# Patient Record
Sex: FEMALE | Race: BLACK OR AFRICAN AMERICAN | NOT HISPANIC OR LATINO | URBAN - METROPOLITAN AREA
[De-identification: names, ages, dates, MRNs, and addresses within clinical notes are randomized per-mention and may not be internally consistent; named-entity substitution may affect disease eponyms.]

---

## 2019-03-06 DIAGNOSIS — F41.9 ANXIETY: Primary | ICD-10-CM

## 2019-03-06 RX ORDER — CLORAZEPATE DIPOTASSIUM 7.5 MG/1
7.5 TABLET ORAL EVERY 12 HOURS
Qty: 60 TABLET | Refills: 2 | Status: SHIPPED | OUTPATIENT
Start: 2019-03-06 | End: 2019-08-02 | Stop reason: SDUPTHER

## 2019-03-06 RX ORDER — CLORAZEPATE DIPOTASSIUM 7.5 MG/1
7.5 TABLET ORAL EVERY 12 HOURS
Refills: 2 | COMMUNITY
Start: 2019-01-22 | End: 2019-03-06 | Stop reason: SDUPTHER

## 2019-03-07 DIAGNOSIS — E87.6 HYPOKALEMIA: Primary | ICD-10-CM

## 2019-03-07 RX ORDER — POTASSIUM CHLORIDE 20 MEQ/1
20 TABLET, EXTENDED RELEASE ORAL 2 TIMES DAILY
Qty: 60 TABLET | Refills: 5 | Status: SHIPPED | OUTPATIENT
Start: 2019-03-07 | End: 2019-11-23 | Stop reason: SDUPTHER

## 2019-05-28 DIAGNOSIS — I10 ESSENTIAL HYPERTENSION: Primary | ICD-10-CM

## 2019-05-28 RX ORDER — ENALAPRIL MALEATE 20 MG/1
20 TABLET ORAL 2 TIMES DAILY
Qty: 180 TABLET | Refills: 3 | Status: SHIPPED | OUTPATIENT
Start: 2019-05-28 | End: 2019-12-29

## 2019-05-28 RX ORDER — ENALAPRIL MALEATE 20 MG/1
1 TABLET ORAL 2 TIMES DAILY
COMMUNITY
End: 2019-05-28 | Stop reason: SDUPTHER

## 2019-08-02 DIAGNOSIS — F41.9 ANXIETY: ICD-10-CM

## 2019-08-02 RX ORDER — CLORAZEPATE DIPOTASSIUM 7.5 MG/1
7.5 TABLET ORAL EVERY 12 HOURS
Qty: 60 TABLET | Refills: 2 | Status: SHIPPED | OUTPATIENT
Start: 2019-08-02

## 2019-11-23 DIAGNOSIS — E87.6 HYPOKALEMIA: ICD-10-CM

## 2019-11-24 RX ORDER — POTASSIUM CHLORIDE 20 MEQ/1
TABLET, EXTENDED RELEASE ORAL
Qty: 60 TABLET | Refills: 5 | Status: SHIPPED | OUTPATIENT
Start: 2019-11-24

## 2019-12-29 DIAGNOSIS — I10 ESSENTIAL HYPERTENSION: ICD-10-CM

## 2019-12-29 RX ORDER — ENALAPRIL MALEATE 20 MG/1
TABLET ORAL
Qty: 60 TABLET | Refills: 5 | Status: SHIPPED | OUTPATIENT
Start: 2019-12-29

## 2024-04-09 ENCOUNTER — TELEPHONE (OUTPATIENT)
Dept: HEMATOLOGY/ONCOLOGY | Facility: CLINIC | Age: 49
End: 2024-04-09
Payer: COMMERCIAL

## 2024-04-09 PROBLEM — I63.9 CRYPTOGENIC STROKE: Status: ACTIVE | Noted: 2024-04-09

## 2024-04-10 ENCOUNTER — OFFICE VISIT (OUTPATIENT)
Dept: HEMATOLOGY/ONCOLOGY | Facility: CLINIC | Age: 49
End: 2024-04-10
Attending: INTERNAL MEDICINE
Payer: COMMERCIAL

## 2024-04-10 VITALS
DIASTOLIC BLOOD PRESSURE: 82 MMHG | RESPIRATION RATE: 18 BRPM | OXYGEN SATURATION: 100 % | BODY MASS INDEX: 37.59 KG/M2 | HEIGHT: 64 IN | SYSTOLIC BLOOD PRESSURE: 145 MMHG | HEART RATE: 65 BPM | TEMPERATURE: 98 F | WEIGHT: 220.19 LBS

## 2024-04-10 DIAGNOSIS — E78.5 DYSLIPIDEMIA: ICD-10-CM

## 2024-04-10 DIAGNOSIS — I10 HYPERTENSION, UNSPECIFIED TYPE: ICD-10-CM

## 2024-04-10 DIAGNOSIS — E11.59 TYPE 2 DIABETES MELLITUS WITH OTHER CIRCULATORY COMPLICATION, WITHOUT LONG-TERM CURRENT USE OF INSULIN: ICD-10-CM

## 2024-04-10 DIAGNOSIS — I63.9 CRYPTOGENIC STROKE: Primary | ICD-10-CM

## 2024-04-10 DIAGNOSIS — I63.9 CRYPTOGENIC STROKE: ICD-10-CM

## 2024-04-10 LAB
EST. AVERAGE GLUCOSE BLD GHB EST-MCNC: 131.2 MG/DL
HBA1C MFR BLD: 6.2 %

## 2024-04-10 PROCEDURE — 85613 RUSSELL VIPER VENOM DILUTED: CPT | Performed by: INTERNAL MEDICINE

## 2024-04-10 PROCEDURE — 86140 C-REACTIVE PROTEIN: CPT | Performed by: INTERNAL MEDICINE

## 2024-04-10 PROCEDURE — 36415 COLL VENOUS BLD VENIPUNCTURE: CPT | Performed by: INTERNAL MEDICINE

## 2024-04-10 PROCEDURE — 83036 HEMOGLOBIN GLYCOSYLATED A1C: CPT | Performed by: INTERNAL MEDICINE

## 2024-04-10 PROCEDURE — 85303 CLOT INHIBIT PROT C ACTIVITY: CPT | Performed by: INTERNAL MEDICINE

## 2024-04-10 PROCEDURE — 85670 THROMBIN TIME PLASMA: CPT | Performed by: INTERNAL MEDICINE

## 2024-04-10 PROCEDURE — 99205 OFFICE O/P NEW HI 60 MIN: CPT | Mod: S$PBB,,, | Performed by: INTERNAL MEDICINE

## 2024-04-10 PROCEDURE — 86038 ANTINUCLEAR ANTIBODIES: CPT | Performed by: INTERNAL MEDICINE

## 2024-04-10 PROCEDURE — 86039 ANTINUCLEAR ANTIBODIES (ANA): CPT | Performed by: INTERNAL MEDICINE

## 2024-04-10 PROCEDURE — 85306 CLOT INHIBIT PROT S FREE: CPT | Performed by: INTERNAL MEDICINE

## 2024-04-10 PROCEDURE — 86036 ANCA SCREEN EACH ANTIBODY: CPT | Performed by: INTERNAL MEDICINE

## 2024-04-10 PROCEDURE — 85652 RBC SED RATE AUTOMATED: CPT | Performed by: INTERNAL MEDICINE

## 2024-04-10 PROCEDURE — 86147 CARDIOLIPIN ANTIBODY EA IG: CPT | Performed by: INTERNAL MEDICINE

## 2024-04-10 PROCEDURE — 99214 OFFICE O/P EST MOD 30 MIN: CPT | Mod: PBBFAC | Performed by: INTERNAL MEDICINE

## 2024-04-10 RX ORDER — NIFEDIPINE 60 MG/1
60 TABLET, EXTENDED RELEASE ORAL EVERY MORNING
COMMUNITY

## 2024-04-10 RX ORDER — LISINOPRIL 20 MG/1
20 TABLET ORAL
COMMUNITY
Start: 2023-10-27

## 2024-04-10 RX ORDER — MAGNESIUM GLUCONATE 27.5 (500)
500 TABLET ORAL
COMMUNITY

## 2024-04-10 RX ORDER — DULAGLUTIDE 0.75 MG/.5ML
0.75 INJECTION, SOLUTION SUBCUTANEOUS WEEKLY
COMMUNITY

## 2024-04-10 RX ORDER — GLIPIZIDE 5 MG/1
5 TABLET, FILM COATED, EXTENDED RELEASE ORAL EVERY MORNING
COMMUNITY
Start: 2023-10-27

## 2024-04-10 RX ORDER — NAPROXEN SODIUM 220 MG/1
1 TABLET, FILM COATED ORAL EVERY MORNING
COMMUNITY
Start: 2023-10-27 | End: 2024-10-26

## 2024-04-10 NOTE — PROGRESS NOTES
History:  Past Medical History:   Diagnosis Date    Diabetes mellitus     High cholesterol     Hypertension     Stroke        Past Surgical History:   Procedure Laterality Date     SECTION      x3      Past medical history:  Hypertension; dyslipidemia; NIDDM; obesity, morbid; anxiety; CVA due to stenosis of small artery with right-sided deficits    Social history:  .  Lives in [place].  Has 5 children.  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  Used to drink 4-5 alcoholic drinks every week; drank for 3-4 years; quit after stroke which she experienced in 2023.  Nonsmoker.  No illicit drugs.      Family history:  Mother experienced a CVA at age 72 (she was history of hypertension, diabetes mellitus, probably dyslipidemia, and smoking).  Father  from MI at age 40.      Health maintenance:  As of 04/10/2024, she has never had screening mammogram done and she is scared of getting a mammogram done.  At my advice, she has agreed to discuss this with her PCP further.    Menstrual history:  Says that she used to have heavy menstrual cycles before experiencing ischemic stroke in 2023; subsequently, she is trying to manage this condition herself by dietary modifications.  As of 04/10/2024, she has not consulted with gyn as yet.  Says that after stroke, cycles have eased up.  Social History     Socioeconomic History    Marital status:    Tobacco Use    Smoking status: Never    Smokeless tobacco: Never   Substance and Sexual Activity    Alcohol use: Not Currently    Drug use: Never      Family History   Problem Relation Age of Onset    Diabetes Mother     Hypertension Mother     Hypertension Father         Reason for Follow-up:  Cryptogenic CVA    History of Present Illness:   Cerebrovascular Accident      Oncologic/Hematologic History:  Oncology History    No history exists.   48-year-old lady, referred by Keyshawn Moore MD, [facility], for evaluation of  CVA.    She experienced ischemic stroke due to stenosis of small artery.  Has been referred by Neurology for hypercoagulable workup given young age for CVA.    Clinical events:  -presentation:  10/24/2023 (hospitalization 10/24/2023-10/26/2023:  [facility]):  Right-sided facial droop, slurring of speech, right arm and right leg weakness and difficulty walking since 10/20/2023; did not seek medical attention at the time of onset of symptoms; MRI brain revealed acute left pontine infarct; etiology of the stroke was considered to be small-vessel disease in the setting of comorbidities; her symptoms were not localizing to the territory of the left ICA; brainstem infarct in the territory of basilar pontine perforators; she was not a candidate for IV thrombolytics due to be onset of symptoms > 4.5 hours and was not a candidate for mechanical thrombectomy to the absence of large vessel occlusion; she was started on dual antiplatelet therapy X 21 days followed by monotherapy along with high-intensity statin; during hospitalization, hemoglobin A1c came back at 7.7 and lipid panel showed  mg/dL  -10/24/2023:  Chest x-ray one view:  No evidence of acute cardiac or pulmonary disease  -10/24/2023:  CT head without contrast:  No acute intracranial abnormality  -10/24/2023:  CTA neck with IV contrast:  No hemodynamically significant stenosis, aneurysm, or acute vascular injury within the neck  -10/24/2023: CTA head: Segmental moderate to severe narrowing of the left anterior supraclinoid ICA; no occlusion or aneurysm  -10/25/2023:  MRI brain without contrast (comparison:  CT brain 10/24/2023):  Acute ischemia in left jean paul  -10/25/2023: TTE:  Negative bubble study; normal LVEF, 60-65%; grade 1 (mild) LV diastolic dysfunction  -01/10/2024:  [facility] vascular neurology clinic note:  Some weakness right arm along with stiffness and pain; significant recovery in the right leg and almost  being back to baseline; able to return to work; has not been using her right arm and hand as much as compared to the left side; compliant with aspirin 81 mg daily and Lipitor 80 mg daily      Labs reviewed:  -10/24/2023: Hemoglobin A1c 7.7  -10/24/2023:  PT/INR 12.2/1.0, normal; PTT 27 seconds, normal  -10/24/2023:  CMP: Glucose 211 mg/dL  -10/25/2023:  Cholesterol 272; HDL 50; ; non-HDL cholesterol 222; triglycerides 119 (normal)  -10/25/2023:  Urine drug skin: Negative  -10/26/2023:  BMP: Potassium 3.1, low; glucose 104; calcium 8.6  -10/26/2023:  CBC:  Hemoglobin 11.2; MCV 83; platelets 387 K    04/10/2024:  Pleasant lady who presents for initial hematology consultation.  In no acute discomfort.  Says that post CVA weakness on right side of body has improved considerably.  Strength has completely recovered in right lower extremity.  Some weakness persists in right upper extremity.   strength with right hand has not fully recovered.  No numbness.  No facial numbness or residual speech impairment.  No headaches, vision impairment, or seizure-like activity.  She fully return to her job as LPN at nursing home, 12/21/2023.  Currently, on baby aspirin daily.  No GI side effects.  No bleeding or bruising with aspirin.  Denies history of blood clots in any form.  No history of lupus or vasculitis.  Denies weakness, fatigue, malaise, recurrent fevers, drenching night sweats, anorexia, unintentional weight loss, or lymph node normal.  No chest pain, cough, dyspnea, abdominal pain, nausea, vomiting, etc..  ECOG 0.       Interval History:  [No matching plan found]   [No matching plan found]       Medications:  Current Outpatient Medications on File Prior to Visit   Medication Sig Dispense Refill    aspirin 81 MG Chew Take 1 tablet by mouth every morning.      glipiZIDE 5 MG TR24 Take 5 mg by mouth every morning.      lisinopriL (PRINIVIL,ZESTRIL) 20 MG tablet Take 20 mg by mouth.      magnesium gluconate 27.5 mg  magne- sium (500 mg) Tab Take 500 mg by mouth.      NIFEdipine (PROCARDIA-XL) 60 MG (OSM) 24 hr tablet Take 60 mg by mouth every morning.      TRULICITY 0.75 mg/0.5 mL pen injector Inject 0.75 mg into the skin once a week.      clorazepate (TRANXENE) 7.5 MG Tab Take 1 tablet (7.5 mg total) by mouth every 12 (twelve) hours. (Patient not taking: Reported on 4/10/2024) 60 tablet 2    enalapril (VASOTEC) 20 MG tablet TAKE 1 TABLET BY MOUTH TWICE A DAY (Patient not taking: Reported on 4/10/2024) 60 tablet 5    potassium chloride SA (K-DUR,KLOR-CON) 20 MEQ tablet TAKE 1 TABLET BY MOUTH TWICE A DAY (Patient not taking: Reported on 4/10/2024) 60 tablet 5     No current facility-administered medications on file prior to visit.       Review of Systems:   All systems reviewed and found to be negative except for the symptoms detailed above    Physical Examination:   VITAL SIGNS:   Vitals:    04/10/24 1338   BP: (!) 145/82   Pulse: 65   Resp: 18   Temp: 98.2 °F (36.8 °C)     GENERAL:  In no apparent distress.    HEAD:  No signs of head trauma.  EYES:  Pupils are equal.  Extraocular motions intact.    EARS:  Hearing grossly intact.  MOUTH:  Oropharynx is normal.   NECK:  No adenopathy, no JVD.     CHEST:  Chest with clear breath sounds bilaterally.  No wheezes, rales, rhonchi.    CARDIAC:  Regular rate and rhythm.  S1 and S2, without murmurs, gallops, rubs.  VASCULAR:  No Edema.  Peripheral pulses normal and equal in all extremities.  ABDOMEN:  Soft, without detectable tenderness.  No sign of distention.  No   rebound or guarding, and no masses palpated.   Bowel Sounds normal.  MUSCULOSKELETAL:  Good range of motion of all major joints. Extremities without clubbing, cyanosis or edema.    NEUROLOGIC EXAM:  Alert and oriented x 3.  No focal sensory or strength deficits.   Speech normal.  Follows commands.  PSYCHIATRIC:  Mood normal.    No results found for this or any previous visit.  No results found for this or any previous  visit.    Assessment:  Problem List Items Addressed This Visit          Neuro    Cryptogenic stroke - Primary     Other Visit Diagnoses       Hypertension, unspecified type        Type 2 diabetes mellitus with other circulatory complication, without long-term current use of insulin        Relevant Medications    glipiZIDE 5 MG TR24    TRULICITY 0.75 mg/0.5 mL pen injector    Dyslipidemia              Ischemic CVA, presumably, cryptogenic/small-vessel disease:  -presentation:  10/24/2023 (hospitalization 10/24/2023-10/26/2023:  [facility]):  Right-sided facial droop, slurring of speech, right arm and right leg weakness and difficulty walking since 10/20/2023; did not seek medical attention at the time of onset of symptoms; MRI brain revealed acute left pontine infarct; etiology of the stroke was considered to be small-vessel disease in the setting of comorbidities; her symptoms were not localizing to the territory of the left ICA; brainstem infarct in the territory of basilar pontine perforators; she was not a candidate for IV thrombolytics due to be onset of symptoms > 4.5 hours and was not a candidate for mechanical thrombectomy to the absence of large vessel occlusion; she was started on dual antiplatelet therapy X 21 days followed by monotherapy along with high-intensity statin; during hospitalization, hemoglobin A1c came back at 7.7 and lipid panel showed  mg/dL  -10/24/2023:  Chest x-ray one view:  No evidence of acute cardiac or pulmonary disease  -10/24/2023:  CT head without contrast:  No acute intracranial abnormality  -10/24/2023:  CTA neck with IV contrast:  No hemodynamically significant stenosis, aneurysm, or acute vascular injury within the neck  -10/24/2023: CTA head: Segmental moderate to severe narrowing of the left anterior supraclinoid ICA; no occlusion or aneurysm  -10/25/2023:  MRI brain without contrast (comparison:  CT brain 10/24/2023):  Acute ischemia in left  jean paul  -10/25/2023: TTE:  Negative bubble study; normal LVEF, 60-65%; grade 1 (mild) LV diastolic dysfunction  -01/10/2024:  [facility] vascular neurology clinic note:  Some weakness right arm along with stiffness and pain; significant recovery in the right leg and almost being back to baseline; able to return to work; has not been using her right arm and hand as much as compared to the left side; compliant with aspirin 81 mg daily and Lipitor 80 mg daily  To summarize:  -history of hypertension, NIDDM, dyslipidemia; uncontrolled; noncompliant with medications  -experienced acute ischemic CVA left jean paul with right-sided facial droop, slurring of speech, right arm and right leg weakness and difficulty walking since 10/20/2023 before presenting to the hospital 10/24/2023  -the stroke was considered to be small-vessel disease in the setting of comorbidities including hypertension, NIDDM, dyslipidemia  -she was found to have severe narrowing of the left anterior supraclinoid ICA but symptoms were not localizing to the territory of the left ICA  -per neurology, brainstem infarct in the territory of the basilar pontine perforators  -she was not a candidate for IV thrombolytics due to the onset of symptoms > 4.5 hours  -she was not a candidate for mechanical thrombectomy due to the absence of large vessel occlusion  -she was treated with dual antiplatelet therapy X 21 days, followed by monotherapy  -CTA neck 10/24/2023:  No hemodynamically significant stenosis   -CTA head 10/24/2023:  Severe narrowing of the left anterior supraclinoid ICA; no occlusion or aneurysm  -MRI brain without contrast 10/25/2023:  Acute ischemia left jean paul  -TTE 10/25/2023:  Negative bubble study; normal LVEF  -she was noncompliant with medications for NIDDM, hypertension, and dyslipidemia         Plan:   Check lupus anticoagulant, anticardiolipin antibody, beta 2 glycoprotein 1 antibody  Check GONZÁLEZ, double-stranded DNA antibody, ESR, CRP,  serum cryoglobulins, Anca, complement levels  Factor 5 Leiden mutation, prothrombin gene mutation, free protein S antigen level, protein C activity, antithrombin 3 level  Follow-up in 3 weeks with labs  ------------------------------------      Patient experienced cryptogenic stroke (no definite cardioembolism, large artery atherosclerosis, or evidence of atrial fibrillation on 12 lead EKG; not clear whether the patient underwent prolonged cardiac monitoring or not)    -it is unclear whether she underwent prolonged cardiac monitoring to rule out the possibility of atrial fibrillation; if not, then, she needs to follow-up with cardiology  -meticulous management of hypertension, NIDDM, and dyslipidemia per primary care provider  -continue antiplatelet therapy; follow-up with neurology    For evaluation of cryptogenic stroke, we will order the following:   Testing for the antiphospholipid syndrome: Antiphospholipid antibody; anticardiolipin antibody; beta 2 glycoprotein 1 antibody  Check GONZÁLEZ, double-stranded DNA antibody, ESR, CRP, serum cryoglobulins, Anca, complement levels  Additional testing for hypercoagulable states associated with venous thrombosis: Factor V Leiden mutation, prothrombin gene mutation, free protein S antigen level, protein C activity, antithrombin III level (this testing is recommended for patients with evidence of a cardiac or pulmonary right-to-left shunt; however, this patient had a negative bubble study)  Needs prolonged (at least 30 days) cardiac monitoring rule out atrial fibrillation; she has declined to be referred to cardiologist today; she tells me that she will get in contact with the primary care provider in [place], to be referred to a cardiologist at our [facility].    Has never had screening mammography done; says that she is scared of mammogram; I stressed the importance of screening mammography; she says that she will think about it and we will get it arranged  through her PCP in [place], if and when she feels comfortable having it done.  Continue baby aspirin 81 mg daily as recommended by Neurology.    Follow-up in 3 weeks with labs    Above discussed at length with the patient.  All questions answered.    Discussed labs and scans and gave her copies of relevant records.    Discussed plan of investigations from Hematology standpoint.    She understands and agrees with this plan.  ==============================    Discussion:  Limitations of testing for hyper coagulable state in patients with CVA:  1. Relatively low prevalence of thrombophilias  2.  Alteration of the sensitivity and specificity of the tests involved in the setting of an acute ischemic event  3. It remains unclear if the inherited thrombophilias directly predispose to stroke as they do to venous thrombosis.  4. The relationship of a positive test is likely to be coincidental rather than causal in almost all cases  5.  The prevalence of inherited deficiencies of protein C, protein S, antithrombin III, or plasminogen is low in unselected patients with ischemic stroke    Recommendation to obtain testing for hypercoagulable conditions in patients with ischemic CVA:  -if personal or family history of systemic thrombosis  -if no clear etiology for ischemic stroke or TIA, despite cardiac and vascular imaging, especially when involving young patients  -if clinical findings that suggest SLE or antiphospholipid antibody syndrome    For cryptogenic stroke: Consider occult hypercoagulable states such as antiphospholipid syndrome, genetic thrombophilia, and hypercoagulable state associated with malignancy    Follow-up:  No follow-ups on file.

## 2024-04-10 NOTE — Clinical Note
Check lupus anticoagulant, anticardiolipin antibody, beta 2 glycoprotein 1 antibody Check GONZÁLEZ, double-stranded DNA antibody, ESR, CRP, serum cryoglobulins, Anca, complement levels Factor 5 Leiden mutation, prothrombin gene mutation, free protein S antigen level, protein C activity, antithrombin 3 level Follow-up in 3 weeks with labs

## 2024-05-06 DIAGNOSIS — I63.9 CRYPTOGENIC STROKE: Primary | ICD-10-CM

## 2024-06-28 ENCOUNTER — TELEPHONE (OUTPATIENT)
Dept: HEMATOLOGY/ONCOLOGY | Facility: CLINIC | Age: 49
End: 2024-06-28
Payer: COMMERCIAL

## 2024-07-10 ENCOUNTER — TELEPHONE (OUTPATIENT)
Dept: HEMATOLOGY/ONCOLOGY | Facility: CLINIC | Age: 49
End: 2024-07-10
Payer: COMMERCIAL